# Patient Record
Sex: FEMALE | Race: BLACK OR AFRICAN AMERICAN | NOT HISPANIC OR LATINO | URBAN - METROPOLITAN AREA
[De-identification: names, ages, dates, MRNs, and addresses within clinical notes are randomized per-mention and may not be internally consistent; named-entity substitution may affect disease eponyms.]

---

## 2019-11-29 ENCOUNTER — EMERGENCY (EMERGENCY)
Facility: HOSPITAL | Age: 44
LOS: 1 days | Discharge: HOME | End: 2019-11-29
Attending: STUDENT IN AN ORGANIZED HEALTH CARE EDUCATION/TRAINING PROGRAM
Payer: MEDICAID

## 2019-11-29 VITALS
TEMPERATURE: 98 F | RESPIRATION RATE: 18 BRPM | OXYGEN SATURATION: 100 % | DIASTOLIC BLOOD PRESSURE: 78 MMHG | SYSTOLIC BLOOD PRESSURE: 136 MMHG | HEART RATE: 97 BPM

## 2019-11-29 LAB
ALBUMIN SERPL ELPH-MCNC: 4.4 G/DL — SIGNIFICANT CHANGE UP (ref 3.3–5)
ALP SERPL-CCNC: 70 U/L — SIGNIFICANT CHANGE UP (ref 40–120)
ALT FLD-CCNC: 21 U/L — SIGNIFICANT CHANGE UP (ref 4–33)
ANION GAP SERPL CALC-SCNC: 13 MMO/L — SIGNIFICANT CHANGE UP (ref 7–14)
AST SERPL-CCNC: 24 U/L — SIGNIFICANT CHANGE UP (ref 4–32)
BILIRUB SERPL-MCNC: 0.4 MG/DL — SIGNIFICANT CHANGE UP (ref 0.2–1.2)
BUN SERPL-MCNC: 8 MG/DL — SIGNIFICANT CHANGE UP (ref 7–23)
CALCIUM SERPL-MCNC: 9.4 MG/DL — SIGNIFICANT CHANGE UP (ref 8.4–10.5)
CHLORIDE SERPL-SCNC: 102 MMOL/L — SIGNIFICANT CHANGE UP (ref 98–107)
CO2 SERPL-SCNC: 21 MMOL/L — LOW (ref 22–31)
CREAT SERPL-MCNC: 0.64 MG/DL — SIGNIFICANT CHANGE UP (ref 0.5–1.3)
D DIMER BLD IA.RAPID-MCNC: 194 NG/ML — SIGNIFICANT CHANGE UP
GLUCOSE SERPL-MCNC: 128 MG/DL — HIGH (ref 70–99)
HCT VFR BLD CALC: 39.8 % — SIGNIFICANT CHANGE UP (ref 34.5–45)
HGB BLD-MCNC: 13.1 G/DL — SIGNIFICANT CHANGE UP (ref 11.5–15.5)
MCHC RBC-ENTMCNC: 29.4 PG — SIGNIFICANT CHANGE UP (ref 27–34)
MCHC RBC-ENTMCNC: 32.9 % — SIGNIFICANT CHANGE UP (ref 32–36)
MCV RBC AUTO: 89.2 FL — SIGNIFICANT CHANGE UP (ref 80–100)
NRBC # FLD: 0 K/UL — SIGNIFICANT CHANGE UP (ref 0–0)
PLATELET # BLD AUTO: 258 K/UL — SIGNIFICANT CHANGE UP (ref 150–400)
PMV BLD: 10.7 FL — SIGNIFICANT CHANGE UP (ref 7–13)
POTASSIUM SERPL-MCNC: 4.3 MMOL/L — SIGNIFICANT CHANGE UP (ref 3.5–5.3)
POTASSIUM SERPL-SCNC: 4.3 MMOL/L — SIGNIFICANT CHANGE UP (ref 3.5–5.3)
PROT SERPL-MCNC: 8 G/DL — SIGNIFICANT CHANGE UP (ref 6–8.3)
RBC # BLD: 4.46 M/UL — SIGNIFICANT CHANGE UP (ref 3.8–5.2)
RBC # FLD: 12.8 % — SIGNIFICANT CHANGE UP (ref 10.3–14.5)
SODIUM SERPL-SCNC: 136 MMOL/L — SIGNIFICANT CHANGE UP (ref 135–145)
TROPONIN T, HIGH SENSITIVITY: < 6 NG/L — SIGNIFICANT CHANGE UP (ref ?–14)
WBC # BLD: 5.21 K/UL — SIGNIFICANT CHANGE UP (ref 3.8–10.5)
WBC # FLD AUTO: 5.21 K/UL — SIGNIFICANT CHANGE UP (ref 3.8–10.5)

## 2019-11-29 PROCEDURE — 71046 X-RAY EXAM CHEST 2 VIEWS: CPT | Mod: 26

## 2019-11-29 PROCEDURE — 99283 EMERGENCY DEPT VISIT LOW MDM: CPT

## 2019-11-29 NOTE — ED PROVIDER NOTE - CLINICAL SUMMARY MEDICAL DECISION MAKING FREE TEXT BOX
43yo female with pmh of htn, dm presenting with right sided chest pain x 3 days associated with intermittent palpitations. Vitals stables upon arrival. Pain reproducible with palpation especially over 5th rib. R/o rib fracture. Less likely ACS vs PE. PERC negative but given pleuritic chest pain and palpitations with no recent trauma will do d-dimer. Labs, CXR, EKG.

## 2019-11-29 NOTE — ED PROVIDER NOTE - MUSCULOSKELETAL MINIMAL EXAM
Pain with palpation of right side, especially at 5th rib./motor intact/atraumatic/normal range of motion

## 2019-11-29 NOTE — ED PROVIDER NOTE - NSFOLLOWUPINSTRUCTIONS_ED_ALL_ED_FT
Take Advil or Motrin 200mg 2 tabs every 6 hours with food as needed for pain.   Follow up with your primary care doctor if the pain does not get better within a week.   Return to ED if you experience severe chest pain, shortness of breath, loss of consciousness or any other concerning symptoms.

## 2019-11-29 NOTE — ED PROVIDER NOTE - ATTENDING CONTRIBUTION TO CARE
43yo female with pmh of htn, dm presenting with right sided chest pain x 3 days associated with intermittent palpitations. Vitals stables upon arrival. Pain reproducible with palpation especially over 5th rib. R/o rib fracture. Less likely ACS vs PE. PERC negative but given pleuritic chest pain and palpitations with no recent trauma will do d-dimer. Labs, CXR, EKG.    Joo: D-Dimer negative, troponin stable, CXR clear, EKG NSR, likely symptoms attributable to MSK pain, with low suspicion for cardiac etiology, patient explained results, plans for f/u and understands strict return precautions.     MASON Ge: I have personally performed a face to face bedside history and physical examination of this patient. I have discussed the history, examination, review of systems, assessment and plan of management with the resident. I have reviewed the electronic medical record and amended it to reflect my history, review of systems, physical exam, assessment and plan.

## 2019-11-29 NOTE — ED ADULT TRIAGE NOTE - CHIEF COMPLAINT QUOTE
Pt had fall back in June c/o consistent Rt sided rib pain for the past several weeks, denies sob, breathing even and unlabored, no anti-coagulation use.

## 2019-11-29 NOTE — ED PROVIDER NOTE - OBJECTIVE STATEMENT
43 yo female with pmh of DM, HTN and glaucoma presents with right sided chest pain x 3 days. States the pain is right under her right breast. Describes the pain as a constant, sharp non radiating pain that is worse with movement, inspiration and palpation. Denies any alleviating factor. Has not tried medication at home. Denies recent antecedent trauma but states she fell on her right side in June. Reports associated intermittent palpitations but denies sob, dizziness, headache, lower extremity swelling, abdominal pain, nausea/vomiting, urinary symptoms, and other associated symptoms. Patient also reports having some hip pain on and off since June when she fell on her right side. Denies head hit. Reports she is able to ambulate.

## 2019-11-29 NOTE — ED PROVIDER NOTE - PATIENT PORTAL LINK FT
You can access the FollowMyHealth Patient Portal offered by [facility] by registering at the following website: [email or web address]. By joining [employer]’s FollowMyHealth portal, you will also be able to view your health information using other applications (apps) compatible with our system.

## 2019-12-08 ENCOUNTER — EMERGENCY (EMERGENCY)
Facility: HOSPITAL | Age: 44
LOS: 1 days | Discharge: HOME | End: 2019-12-08
Attending: EMERGENCY MEDICINE | Admitting: EMERGENCY MEDICINE
Payer: MEDICAID

## 2019-12-08 VITALS
SYSTOLIC BLOOD PRESSURE: 162 MMHG | HEART RATE: 97 BPM | TEMPERATURE: 98 F | OXYGEN SATURATION: 97 % | DIASTOLIC BLOOD PRESSURE: 89 MMHG | RESPIRATION RATE: 16 BRPM

## 2019-12-08 VITALS
OXYGEN SATURATION: 100 % | DIASTOLIC BLOOD PRESSURE: 99 MMHG | HEART RATE: 91 BPM | RESPIRATION RATE: 24 BRPM | SYSTOLIC BLOOD PRESSURE: 168 MMHG

## 2019-12-08 LAB
ALBUMIN SERPL ELPH-MCNC: 4.1 G/DL — SIGNIFICANT CHANGE UP (ref 3.3–5)
ALP SERPL-CCNC: 67 U/L — SIGNIFICANT CHANGE UP (ref 40–120)
ALT FLD-CCNC: 21 U/L — SIGNIFICANT CHANGE UP (ref 4–33)
ANION GAP SERPL CALC-SCNC: 10 MMO/L — SIGNIFICANT CHANGE UP (ref 7–14)
APTT BLD: 25.8 SEC — LOW (ref 27.5–36.3)
AST SERPL-CCNC: 22 U/L — SIGNIFICANT CHANGE UP (ref 4–32)
BASOPHILS # BLD AUTO: 0.05 K/UL — SIGNIFICANT CHANGE UP (ref 0–0.2)
BASOPHILS NFR BLD AUTO: 0.8 % — SIGNIFICANT CHANGE UP (ref 0–2)
BILIRUB SERPL-MCNC: 0.3 MG/DL — SIGNIFICANT CHANGE UP (ref 0.2–1.2)
BUN SERPL-MCNC: 8 MG/DL — SIGNIFICANT CHANGE UP (ref 7–23)
CALCIUM SERPL-MCNC: 9.2 MG/DL — SIGNIFICANT CHANGE UP (ref 8.4–10.5)
CHLORIDE SERPL-SCNC: 104 MMOL/L — SIGNIFICANT CHANGE UP (ref 98–107)
CO2 SERPL-SCNC: 24 MMOL/L — SIGNIFICANT CHANGE UP (ref 22–31)
CREAT SERPL-MCNC: 0.69 MG/DL — SIGNIFICANT CHANGE UP (ref 0.5–1.3)
D DIMER BLD IA.RAPID-MCNC: < 150 NG/ML — SIGNIFICANT CHANGE UP
EOSINOPHIL # BLD AUTO: 0.08 K/UL — SIGNIFICANT CHANGE UP (ref 0–0.5)
EOSINOPHIL NFR BLD AUTO: 1.2 % — SIGNIFICANT CHANGE UP (ref 0–6)
GLUCOSE SERPL-MCNC: 144 MG/DL — HIGH (ref 70–99)
HCT VFR BLD CALC: 38.4 % — SIGNIFICANT CHANGE UP (ref 34.5–45)
HGB BLD-MCNC: 12.9 G/DL — SIGNIFICANT CHANGE UP (ref 11.5–15.5)
IMM GRANULOCYTES NFR BLD AUTO: 0 % — SIGNIFICANT CHANGE UP (ref 0–1.5)
INR BLD: 1.13 — SIGNIFICANT CHANGE UP (ref 0.88–1.17)
LYMPHOCYTES # BLD AUTO: 3.46 K/UL — HIGH (ref 1–3.3)
LYMPHOCYTES # BLD AUTO: 52.5 % — HIGH (ref 13–44)
MCHC RBC-ENTMCNC: 29.8 PG — SIGNIFICANT CHANGE UP (ref 27–34)
MCHC RBC-ENTMCNC: 33.6 % — SIGNIFICANT CHANGE UP (ref 32–36)
MCV RBC AUTO: 88.7 FL — SIGNIFICANT CHANGE UP (ref 80–100)
MONOCYTES # BLD AUTO: 0.5 K/UL — SIGNIFICANT CHANGE UP (ref 0–0.9)
MONOCYTES NFR BLD AUTO: 7.6 % — SIGNIFICANT CHANGE UP (ref 2–14)
NEUTROPHILS # BLD AUTO: 2.5 K/UL — SIGNIFICANT CHANGE UP (ref 1.8–7.4)
NEUTROPHILS NFR BLD AUTO: 37.9 % — LOW (ref 43–77)
NRBC # FLD: 0 K/UL — SIGNIFICANT CHANGE UP (ref 0–0)
PLATELET # BLD AUTO: 240 K/UL — SIGNIFICANT CHANGE UP (ref 150–400)
PMV BLD: 10.7 FL — SIGNIFICANT CHANGE UP (ref 7–13)
POTASSIUM SERPL-MCNC: 4.1 MMOL/L — SIGNIFICANT CHANGE UP (ref 3.5–5.3)
POTASSIUM SERPL-SCNC: 4.1 MMOL/L — SIGNIFICANT CHANGE UP (ref 3.5–5.3)
PROT SERPL-MCNC: 7.4 G/DL — SIGNIFICANT CHANGE UP (ref 6–8.3)
PROTHROM AB SERPL-ACNC: 12.6 SEC — SIGNIFICANT CHANGE UP (ref 9.8–13.1)
RBC # BLD: 4.33 M/UL — SIGNIFICANT CHANGE UP (ref 3.8–5.2)
RBC # FLD: 12.7 % — SIGNIFICANT CHANGE UP (ref 10.3–14.5)
SODIUM SERPL-SCNC: 138 MMOL/L — SIGNIFICANT CHANGE UP (ref 135–145)
TROPONIN T, HIGH SENSITIVITY: < 6 NG/L — SIGNIFICANT CHANGE UP (ref ?–14)
WBC # BLD: 6.59 K/UL — SIGNIFICANT CHANGE UP (ref 3.8–10.5)
WBC # FLD AUTO: 6.59 K/UL — SIGNIFICANT CHANGE UP (ref 3.8–10.5)

## 2019-12-08 PROCEDURE — 71046 X-RAY EXAM CHEST 2 VIEWS: CPT | Mod: 26

## 2019-12-08 PROCEDURE — 93971 EXTREMITY STUDY: CPT | Mod: 26,LT

## 2019-12-08 PROCEDURE — 99284 EMERGENCY DEPT VISIT MOD MDM: CPT | Mod: 25

## 2019-12-08 PROCEDURE — 93010 ELECTROCARDIOGRAM REPORT: CPT

## 2019-12-08 NOTE — ED PROVIDER NOTE - PROGRESS NOTE DETAILS
CHAR Klien: EKG non-ischemic, CXR clear lungs, D-Dimer neg, trop <6 -> doubt PE or ACS. Pain likely MSK. Rx Ibuprofen. PMD follow up. Return precautions. c/d/w Dr. Aly

## 2019-12-08 NOTE — ED PROVIDER NOTE - ATTENDING CONTRIBUTION TO CARE
Seen and examined, 2 wks of episodic R sided CP, no assoc. with exertion, no assoc. SOB/N/V/diaphoresis, no travel/trauma/leg c/o, seen at urgentcare and referred to ED due to concern over PE. Pt. denies SOB at rest in ED, elev. HR prior to arrival, unable to r/o by PERC criteria. MMM, clear lungs, heart reg, abd soft, NT to palp no CVAT, no edema, NT calves.

## 2019-12-08 NOTE — ED ADULT NURSE NOTE - NSIMPLEMENTINTERV_GEN_ALL_ED
Implemented All Universal Safety Interventions:  [place] to call system. Call bell, personal items and telephone within reach. Instruct patient to call for assistance. Room bathroom lighting operational. Non-slip footwear when patient is off stretcher. Physically safe environment: no spills, clutter or unnecessary equipment. Stretcher in lowest position, wheels locked, appropriate side rails in place.

## 2019-12-08 NOTE — ED PROVIDER NOTE - OBJECTIVE STATEMENT
44 year old female with no known PMH presents to the ED complaining of chest pain for 2 weeks. Pt describes right-sided chest pain, that seems to radiate to the right shoulder/upper and lower back for 2 weeks, worsened with movement, constant, no relieving factors; pain is non-exertional, non-positional, non-pleuritic; not associated with dyspnea, diaphoresis, leg pain or swelling, cough, fever or chills. Pt states she suffered from muscle pain to back for a long time now after a fall years ago; denies any bowel/urinary incontinence, weakness to the perineum, numbness or tingling sensation. Pt denies a hx of PE/DVT, or family hx of cardiac disease a young age; no recent travel/hospitalization. No other complaints. Pt was seen at an Urgent care and referred for r/o PE.

## 2019-12-08 NOTE — ED ADULT NURSE NOTE - OBJECTIVE STATEMENT
pt received to 14, aox3.  pt c/o right sided chest pain radiating to her back with exertional SOB x 2 weeks.  pt reports having a "cold and cough" x 2 weeks as well.  pt says pain is worse upon movement and palpation.  pt appears comfortable.  placed on tele monitor, v/s as noted.  PA at bedside, awaiting further orders will monitor

## 2019-12-08 NOTE — ED ADULT TRIAGE NOTE - CHIEF COMPLAINT QUOTE
Sent to ER by urgent care to r/o PE. Pt. c/o constant chest pain, palpitations, sob and dizziness x 1 wk. States she was seen here 1 wk ago for similar symptoms. Also c/o right calf pain. No swelling noted. Sinus arrythmia noted on EKG done at .

## 2019-12-08 NOTE — ED PROVIDER NOTE - PATIENT PORTAL LINK FT
You can access the FollowMyHealth Patient Portal offered by [facility] by registering at the following website: [email or web address]. By joining [employer]’s FollowMyHealth portal, you will also be able to view your health information using other applications (apps) compatible with our system.

## 2019-12-08 NOTE — ED ADULT NURSE NOTE - IS THE PATIENT ABLE TO BE SCREENED?
----- Message from Herminio Summers DO sent at 8/26/2019 12:27 PM CDT -----  Labs show BS a little better with A1c 6.2  No changes  Call or send letter     Yes

## 2019-12-08 NOTE — ED PROVIDER NOTE - CLINICAL SUMMARY MEDICAL DECISION MAKING FREE TEXT BOX
44 year old female with no known PMH presents to the ED complaining of chest pain for 2 weeks. HD stable. EKG non-ischemic. Chest pain, very atypical, low risk ACS- labs including cardiac markers, and CXR; PERC negative, PE less likely- plan for D-Dimer; if D-Dimer negative, won't pursue any imaging studies. Monitor and reassess.

## 2019-12-08 NOTE — ED PROVIDER NOTE - NSFOLLOWUPINSTRUCTIONS_ED_ALL_ED_FT
Please follow up with your primary medical doctor in 1-2 days.  Return to the emergency department if new, worsened or concerning symptoms.

## 2021-01-12 PROBLEM — Z00.00 ENCOUNTER FOR PREVENTIVE HEALTH EXAMINATION: Status: ACTIVE | Noted: 2021-01-12

## 2021-01-14 NOTE — ED PROVIDER NOTE - CROS ED GI ALL NEG
01/14/21 1524 01/14/21 1525 01/14/21 1531   Transfers   Sit to Stand Stand by assistance; Independent  --   --    Stand to sit Stand by assistance; Independent  --   --    Lateral Transfers Stand by assistance; Independent  (W/C to Recliner)  --   --    Ambulation   Ambulation?  --  Yes  --    Ambulation 1   Surface  --  level tile  --    Device  --  Rolling Walker  --    Other Apparatus  --  O2  (1L)  --    Assistance  --  Stand by assistance  --    Distance  --  [de-identified]' x2  --    Comments  --  Incorporated turn. --    Exercises   Comments  --   --  Sitting BLE ex x15 reps. Conditions Requiring Skilled Therapeutic Intervention   Assessment  --   --   --    Activity Tolerance   Activity Tolerance  --   --   --    Safety Devices   Type of devices  --   --   --       01/14/21 1532 01/14/21 1556   Transfers   Sit to Stand  --   --    Stand to sit  --   --    Lateral Transfers  --   --    Ambulation   Ambulation?  --   --    Ambulation 1   Surface  --   --    Device  --   --    Other Apparatus  --   --    Assistance  --   --    Distance  --   --    Comments  --   --    Exercises   Comments  --   --    Conditions Requiring Skilled Therapeutic Intervention   Assessment Radha session with rests  --    Activity Tolerance   Activity Tolerance Patient Tolerated treatment well;Patient limited by endurance  --    Safety Devices   Type of devices  --  Call light within reach; Chair alarm in place   Electronically signed by Kaleigh Nino PTA on 1/14/2021 at 3:57 PM negative...

## 2021-02-21 ENCOUNTER — EMERGENCY (EMERGENCY)
Facility: HOSPITAL | Age: 46
LOS: 1 days | Discharge: HOME | End: 2021-02-21
Attending: EMERGENCY MEDICINE | Admitting: EMERGENCY MEDICINE
Payer: MEDICAID

## 2021-02-21 VITALS
RESPIRATION RATE: 16 BRPM | SYSTOLIC BLOOD PRESSURE: 141 MMHG | HEART RATE: 87 BPM | OXYGEN SATURATION: 100 % | DIASTOLIC BLOOD PRESSURE: 87 MMHG | TEMPERATURE: 98 F

## 2021-02-21 PROBLEM — I10 ESSENTIAL (PRIMARY) HYPERTENSION: Chronic | Status: ACTIVE | Noted: 2019-12-08

## 2021-02-21 PROCEDURE — 73090 X-RAY EXAM OF FOREARM: CPT | Mod: 26,RT

## 2021-02-21 PROCEDURE — 73080 X-RAY EXAM OF ELBOW: CPT | Mod: 26,RT

## 2021-02-21 PROCEDURE — 73030 X-RAY EXAM OF SHOULDER: CPT | Mod: 26,RT

## 2021-02-21 PROCEDURE — 73562 X-RAY EXAM OF KNEE 3: CPT | Mod: 26,RT

## 2021-02-21 PROCEDURE — 71101 X-RAY EXAM UNILAT RIBS/CHEST: CPT | Mod: 26,RT

## 2021-02-21 PROCEDURE — 99284 EMERGENCY DEPT VISIT MOD MDM: CPT

## 2021-02-21 PROCEDURE — 73502 X-RAY EXAM HIP UNI 2-3 VIEWS: CPT | Mod: 26,RT

## 2021-02-21 RX ORDER — ACETAMINOPHEN 500 MG
650 TABLET ORAL ONCE
Refills: 0 | Status: COMPLETED | OUTPATIENT
Start: 2021-02-21 | End: 2021-02-21

## 2021-02-21 RX ADMIN — Medication 650 MILLIGRAM(S): at 16:20

## 2021-02-21 NOTE — ED ADULT NURSE NOTE - NSIMPLEMENTINTERV_GEN_ALL_ED
Implemented All Fall with Harm Risk Interventions:  [place] to call system. Call bell, personal items and telephone within reach. Instruct patient to call for assistance. Room bathroom lighting operational. Non-slip footwear when patient is off stretcher. Physically safe environment: no spills, clutter or unnecessary equipment. Stretcher in lowest position, wheels locked, appropriate side rails in place. Provide visual cue, wrist band, yellow gown, etc. Monitor gait and stability. Monitor for mental status changes and reorient to person, place, and time. Review medications for side effects contributing to fall risk. Reinforce activity limits and safety measures with patient and family. Provide visual clues: red socks.

## 2021-02-21 NOTE — ED ADULT NURSE NOTE - OBJECTIVE STATEMENT
44 [facility] following a mechanical fall on ice. Pt states she fell on her right side of her body, denies hitting head, neg LOC, neg anticoagulant use. A&Ox4, ambulatory, neg sob, neg chest pain, neg n/v/d, neg fever/cough/body aches. Abrasion noted to right elbow. Small bruise noted to right hip. Medicated as per eMAR. Pending xrays.

## 2021-02-21 NOTE — ED PROVIDER NOTE - NSFOLLOWUPINSTRUCTIONS_ED_ALL_ED_FT
Contusion in Adults  WHAT YOU NEED TO KNOW:  A contusion is a bruise that appears on your skin after an injury. A bruise happens when small blood vessels tear but skin does not. Blood leaks into nearby tissue, such as soft tissue or muscle.  DISCHARGE INSTRUCTIONS:  Seek care immediately if:   •You have new trouble moving the injured area.  •You have tingling or numbness in or near the injured area.  •Your hand or foot below the bruise gets cold or turns pale.  Call your doctor if:   •You find a new lump in the injured area.  •Your symptoms do not improve with treatment after 4 to 5 days.  •You have questions or concerns about your condition or care.  Medicines: You may need any of the following:   •NSAIDs, such as ibuprofen, help decrease swelling, pain, and fever. This medicine is available with or without a doctor's order. NSAIDs can cause stomach bleeding or kidney problems in certain people. If you take blood thinner medicine, always ask your healthcare provider if NSAIDs are safe for you. Always read the medicine label and follow directions.  •Prescription pain medicine may be given. Ask your healthcare provider how to take this medicine safely. Some prescription pain medicines contain acetaminophen. Do not take other medicines that contain acetaminophen without talking to your healthcare provider. Too much acetaminophen may cause liver damage. Prescription pain medicine may cause constipation. Ask your healthcare provider how to prevent or treat constipation.   •Take your medicine as directed. Contact your healthcare provider if you think your medicine is not helping or if you have side effects. Tell him or her if you are allergic to any medicine. Keep a list of the medicines, vitamins, and herbs you take. Include the amounts, and when and why you take them. Bring the list or the pill bottles to follow-up visits. Carry your medicine list with you in case of an emergency.  Help a contusion heal:   •Rest the injured area or use it less than usual. If you bruised your leg or foot, you may need crutches or a cane to help you walk. This will help you keep weight off your injured body part.  •Apply ice to decrease swelling and pain. Ice may also help prevent tissue damage. Use an ice pack, or put crushed ice in a plastic bag. Cover it with a towel and place it on your bruise for 15 to 20 minutes every hour or as directed.  •Use compression to support the area and decrease swelling. Wrap an elastic bandage around the area over the bruised muscle. Make sure the bandage is not too tight. You should be able to fit 1 finger between the bandage and your skin.  •Elevate (raise) your injured body part above the level of your heart to help decrease pain and swelling. Use pillows, blankets, or rolled towels to elevate the area as often as you can.  •Do not drink alcohol as directed. Alcohol may slow healing.  •Do not stretch injured muscles right after your injury. Ask your healthcare provider when and how you may safely stretch after your injury. Gentle stretches can help increase your flexibility.•Do not massage the area or put heating pads on the bruise right after your injury. Heat and massage may slow healing. Your healthcare provider may tell you to apply heat after several days. At that time, heat will start to help the injury heal.  Prevent another contusion:   •Stretch and warm up before you play sports or exercise.  •Wear protective gear when you play sports. Examples are shin guards and padding.   •If you begin a new physical activity, start slowly to give your body a chance to adjust.  Follow up with your doctor as directed: Write down your questions so you remember to ask them during your visits.

## 2021-02-21 NOTE — ED PROVIDER NOTE - ATTENDING CONTRIBUTION TO CARE
I have seen and examined the patient on the patient´s visit date. I have reviewed the note written by Cherie Almodovar [facility], on that visit day. I have supervised and participated as necessary in the performance of procedures indicated for patient management and was available at all phases of the patient´s visit when needed. We discussed the history, physical exam findings, management plan, and  medical decision making. I have made my additions, exceptions, and revisions within the chart and I agree with H and P as documented in its entirety. The data and my interpretation of any data collected from labs, interventions and imaging appear below as well as my independent medical decision making and considerations    The patient is a 45y Female who has a past medical and surgery history of Hypertension, PTED from  s/p slip and fall on the ice with pain in right flank/post ribs lat hip and knee; no HI no No LOC   Vital Signs Last 24 Hrs  T(F): 98.4 HR: 87 BP: 141/87 RR: 16 SpO2: 100% (21 Feb 2021 14:38) (100% - 100%)  PE: as described; my additions and exceptions are noted in the chart    IMPRESSION/RISK:  Dx=Images Pending   Consideration include Doubt fractures if so occult A and O x 3 no F M/S deficits makes this very unlikely  Plan  Analgesics  reassess  dispo as per results and response

## 2021-02-21 NOTE — ED PROVIDER NOTE - OBJECTIVE STATEMENT
46 y/o F pmh DM, HTN and glaucoma c/o pain to her R side after slip and fall on ice this morning. Pt c/o pain to R shoulder, R ribs, R elbow, R forearm, R hip, R knee. Pt able to ambulate. Went to urgent care and was given 600mg motrin with minimal relief. Sent here because urgent care did not have an xray machine. Pt denies difficulty breathing.

## 2021-02-21 NOTE — ED PROVIDER NOTE - PATIENT PORTAL LINK FT
You can access the FollowMyHealth Patient Portal offered by [facility] by registering at the following website: [email or web address]. By joining [employer]’s FollowMyHealth portal, you will also be able to view your health information using other applications (apps) compatible with our system.

## 2021-02-21 NOTE — ED ADULT TRIAGE NOTE - CHIEF COMPLAINT QUOTE
pt had a slip and fall on the sidewalk this morning and is c/o right elbow and hip pain. Pt amb to triage. Sent from urgent care bc the urgent care she went to does not have xray machine. Appears comfortable. denies hitting her head, LOC, anticoagulant use.

## 2021-09-04 NOTE — ED PROVIDER NOTE - NSTIMEPROVIDERCAREINITIATE_GEN_ER
Patient noted to have dark urine on her UA.  Will check blood work and hydrate her with a fluid bolus.   She still c/o dizziness.but seems positional.    Will consult Dr. Bailey due to history of APS and on lovenox.  UA came back with bacteria large and leukocytes.   Will give dose of ancef.   
21-Feb-2021 15:54

## 2022-12-14 NOTE — ED PROVIDER NOTE - DISPOSITION TYPE
DISCHARGE Topical Clindamycin Counseling: Patient counseled that this medication may cause skin irritation or allergic reactions.  In the event of skin irritation, the patient was advised to reduce the amount of the drug applied or use it less frequently.   The patient verbalized understanding of the proper use and possible adverse effects of clindamycin.  All of the patient's questions and concerns were addressed.

## 2024-04-27 NOTE — ED PROVIDER NOTE - GASTROINTESTINAL NEGATIVE STATEMENT, MLM
RLE: 4/5/grossly assessed due to
no abdominal pain, no bloating, no constipation, no diarrhea, no nausea and no vomiting.